# Patient Record
Sex: FEMALE | Race: WHITE
[De-identification: names, ages, dates, MRNs, and addresses within clinical notes are randomized per-mention and may not be internally consistent; named-entity substitution may affect disease eponyms.]

---

## 2019-06-04 ENCOUNTER — HOSPITAL ENCOUNTER (INPATIENT)
Dept: HOSPITAL 26 - MDS | Age: 54
LOS: 1 days | Discharge: HOME | DRG: 263 | End: 2019-06-05
Attending: INTERNAL MEDICINE | Admitting: INTERNAL MEDICINE
Payer: COMMERCIAL

## 2019-06-04 VITALS — DIASTOLIC BLOOD PRESSURE: 60 MMHG | SYSTOLIC BLOOD PRESSURE: 121 MMHG

## 2019-06-04 VITALS — BODY MASS INDEX: 22.19 KG/M2 | HEIGHT: 60 IN | WEIGHT: 113 LBS

## 2019-06-04 VITALS — SYSTOLIC BLOOD PRESSURE: 139 MMHG | DIASTOLIC BLOOD PRESSURE: 69 MMHG

## 2019-06-04 DIAGNOSIS — K80.10: Primary | ICD-10-CM

## 2019-06-04 DIAGNOSIS — E78.5: ICD-10-CM

## 2019-06-04 DIAGNOSIS — I10: ICD-10-CM

## 2019-06-04 PROCEDURE — 0FT44ZZ RESECTION OF GALLBLADDER, PERCUTANEOUS ENDOSCOPIC APPROACH: ICD-10-PCS | Performed by: SURGERY

## 2019-06-04 RX ADMIN — SODIUM CHLORIDE PRN MG: 9 INJECTION, SOLUTION INTRAVENOUS at 22:31

## 2019-06-04 RX ADMIN — HYDROMORPHONE HYDROCHLORIDE PRN MG: 1 INJECTION, SOLUTION INTRAMUSCULAR; INTRAVENOUS; SUBCUTANEOUS at 20:35

## 2019-06-04 RX ADMIN — DEXTROSE AND SODIUM CHLORIDE SCH MLS/HR: 5; .9 INJECTION, SOLUTION INTRAVENOUS at 13:48

## 2019-06-04 RX ADMIN — SODIUM CHLORIDE PRN MG: 9 INJECTION, SOLUTION INTRAVENOUS at 18:26

## 2019-06-04 NOTE — NUR
COMPUTER SLOW DUE TO [email or web address] SCAN THE MED LATER AS SOON AS COMPUTER FIXED.

-------------------------------------------------------------------------------

Addendum: 06/04/19 at 2241 by Zahraa White RN

-------------------------------------------------------------------------------

 ENCODED THE MED MANUALLY AT 2035

## 2019-06-04 NOTE — NUR
RECEIVED PT IN BED,AA, O X 4, [place] SPEAKING ONLY W/ FAMILY, FAMILY ACTS AS . 
PT WAS COMPLAINING THAT SHE WAS NOT FEELING WELL, AS PER PREVIOUS SHIFT & THAT SHE WS 
WHEELED FROM THE OR AND RECEIVED IN THE UNIT AT 1230 PM. SHE WAS C/O OF MORPHINE AND NOT TO 
GIVE TO HER.

## 2019-06-04 NOTE — NUR
FREQUENT ROUNDING ON PT PT IS STABLE AND IN NO APPARENT DISTRESS. NO SIGNS OF REACTION TO 
ANCEF. D5NS INFUSING AT 50ML/HR. PT STATED PAIN IN TOLERABLE AND SHE IS RESTING COMFORTABLY. 
IV SITE SHOWS NO SIGNS OF INFILTRATION OR INFLAMMATION. FAMILY AT BEDSIDE. ALL SAFETY 
MEASURES ARE IN PLACE. WILL CONTINUE TO MONITOR.

## 2019-06-04 NOTE — NUR
ENDORSED PT TO NIGHT SHIFT NURSE PT IS STABLE IN NO APPARENT DISTRESS. ALL SAFETY MEASURES 
ARE IN PLACE

## 2019-06-04 NOTE — NUR
PT ARRIVED ON UNIT POST SURGERY. RECEIVED HANDOFF REPORT. PT IS STABLE AND IN NO APPARENT 
DISTRESS. ALL SAFETY MEASURES ARE IN PLACE. VITAL SIGNS TAKEN 139/69 HR 86 99% SPO2 98.6 
TEMP. PT APPEARS STABLE AND IN NO APPARENT DISTRESS. PT STATED THE ONLY PAIN SHE HAS IS FROM 
THE TRANSFER FROM [facility] TO BED. WILL CONTINUE TO MONITOR.

## 2019-06-05 VITALS — SYSTOLIC BLOOD PRESSURE: 119 MMHG | DIASTOLIC BLOOD PRESSURE: 57 MMHG

## 2019-06-05 VITALS — DIASTOLIC BLOOD PRESSURE: 54 MMHG | SYSTOLIC BLOOD PRESSURE: 122 MMHG

## 2019-06-05 LAB
ANION GAP SERPL CALCULATED.3IONS-SCNC: 12.2 MMOL/L (ref 8–16)
BASOPHILS # BLD AUTO: 0 K/UL (ref 0–0.22)
BASOPHILS NFR BLD AUTO: 0.2 % (ref 0–2)
BUN SERPL-MCNC: 6 MG/DL (ref 7–18)
CHLORIDE SERPL-SCNC: 104 MMOL/L (ref 98–107)
CO2 SERPL-SCNC: 26.6 MMOL/L (ref 21–32)
CREAT SERPL-MCNC: 0.6 MG/DL (ref 0.6–1.3)
EOSINOPHIL # BLD AUTO: 0 K/UL (ref 0–0.4)
EOSINOPHIL NFR BLD AUTO: 0 % (ref 0–4)
ERYTHROCYTE [DISTWIDTH] IN BLOOD BY AUTOMATED COUNT: 12.8 % (ref 11.6–13.7)
GFR SERPL CREATININE-BSD FRML MDRD: 134 ML/MIN (ref 90–?)
GLUCOSE SERPL-MCNC: 134 MG/DL (ref 74–106)
HCT VFR BLD AUTO: 37.8 % (ref 36–48)
HGB BLD-MCNC: 12.5 G/DL (ref 12–16)
LYMPHOCYTES # BLD AUTO: 1.5 K/UL (ref 2.5–16.5)
LYMPHOCYTES NFR BLD AUTO: 11.5 % (ref 20.5–51.1)
MCH RBC QN AUTO: 31 PG (ref 27–31)
MCHC RBC AUTO-ENTMCNC: 33 G/DL (ref 33–37)
MCV RBC AUTO: 92.8 FL (ref 80–94)
MONOCYTES # BLD AUTO: 0.8 K/UL (ref 0.8–1)
MONOCYTES NFR BLD AUTO: 6.3 % (ref 1.7–9.3)
NEUTROPHILS # BLD AUTO: 10.5 K/UL (ref 1.8–7.7)
NEUTROPHILS NFR BLD AUTO: 82 % (ref 42.2–75.2)
PLATELET # BLD AUTO: 224 K/UL (ref 140–450)
POTASSIUM SERPL-SCNC: 3.8 MMOL/L (ref 3.5–5.1)
RBC # BLD AUTO: 4.08 MIL/UL (ref 4.2–5.4)
SODIUM SERPL-SCNC: 139 MMOL/L (ref 136–145)
WBC # BLD AUTO: 12.8 K/UL (ref 4.8–10.8)

## 2019-06-05 RX ADMIN — DEXTROSE AND SODIUM CHLORIDE SCH MLS/HR: 5; .9 INJECTION, SOLUTION INTRAVENOUS at 08:56

## 2019-06-05 NOTE — NUR
PATIENT IS AWAKE AND TALKING TO VISITORS AT BEDSIDE. DENIES PAIN. NO SIGNS OF DISTRESS 
NOTED. SAFETY MEASURES IN PLACE.

## 2019-06-05 NOTE — NUR
PATIENT COMPLAINED PAIN 5/10 AROUND HER INCISIONAL SITES, ADMINISTERED PRN PAIN MED NORCO. 
PATIENT TOLERATED WELL. PATIENT IS SITTING UP ON CHAIR AND TALKING TO FRIENDS BY BEDSIDE. RICH 
DRAIN COLLECTED 20 ML RED DRAINING. INSTRUCTED PATIENT TO USE THE CALL LIGHT FOR ANY 
ASSISTANCE AND PATIENT WAS AWARE.

## 2019-06-05 NOTE — NUR
RECEIVED A CALL FROM SURGEON DR PARKS, REPORTED PATIENT'S CURRENT VITAL SIGNS AND CONDITION TO 
DR PARKS. PER DR PARKS, PATIENT IS OK TO BE DISCHARGE ONCE DR SHAY COMES TO DO THE 
ROUND.

## 2019-06-05 NOTE — NUR
ASSISTED PATIENT TO SIT UP ON BED. PATIENT TOLERATED WELL. DENIES PAIN. NO SIGNS OF DISTRESS 
NOTED. INSTRUCTED PATIENT TO USE THE CALL LIGHT FOR ANY ASSISTANCE AND PATIENT WAS AWARE. 
BED IN  LOW POSITION AND CALL LIGHT WITHIN REACH.

## 2019-06-05 NOTE — NUR
RECEIVED BEDSIDE REPORT FROM NIGHT SHIFT NURSE FOR CONTINUITY OF CARE. PATIENT IS AWAKE AND 
RESTING ON BED AT THIS TIME. PATIENT SPEAKS Irish ONLY, ABLE TO MAKE NEEDS KNOWS AND 
FOLLOW SIMPLE COMMAND. PATIENT DENIES PAIN. RESPIRATION EVEN AND UNLABORED. NO SOB AND SIGNS 
OF DISTRESS NOTED. IV ON L WRIST 20G, INTACT AND CLEAN, INFUSING AS PER MD ORDER. RICH DRAIN 
NOTED AND COLLECTED 10 ML RED DRAINING. DRESSINGS ON ABDOMINAL REGION NOTED, DRY AND INTACT. 
PATIENT IS CONTINENT AND ABLE TO AMBULATE WITH STANDBY ASSIST. DISCUSSED PLAN OF CARE WITH 
PATIENT AND PATIENT VERBALIZED OK. SAFETY MEASURES IN PLACE. INSTRUCTED PATIENT TO USE THE 
CALL LIGHT FOR ANY ASSISTANCE AND PATIENT WAS AWARE.

## 2019-06-05 NOTE — NUR
INFORMED PATIENT THAT SHE WILL BE DISCHARGE HOME TODAY. PER PATIENT, SHE WILL CALL HER NIECE 
AND OLDER SISTER TO PICK HER UP. SHE WILL LET ME KNOW WHEN SHE FINDS OUT WHEN THEY ARE ABLE 
TO PICK HER UP FROM THE HOSPITAL.

## 2019-06-05 NOTE — NUR
PATIENT IS SITTING UP ON A CHAIR AND TALKING TO VISITOR AT BEDSIDE. NO SIGNS OF DISTRESS 
NOTED. INSTRUCTED PATIENT TO USE THE CALL LIGHT FOR ASSISTANCE AND PATIENT WAS AWARE.

## 2019-06-05 NOTE — NUR
PATIENT IS EATING DINNER AND TALKING TO VISITORS. NO SIGNS OF DISTRESS NOTED. WAITING FOR 
NIECE FAIZA AND OLDER SISTER TO BE ARRIVE. SAFETY MEASURES IN PLACE.

## 2019-06-05 NOTE — NUR
DISCHARGE DOCUMENT HAS BEEN SIGNED. D/C RICH DRAIN, AND PATIENT TOLERATED WELL. RICH COLLECTED 
25 ML RED DRAINING. PICTURES TAKEN OF INCISIONAL SITES AND RICH DRAIN SITE. PATIENT IS TALKING 
TO VISITORS AT BEDSIDE. NO SIGNS OF DISTRESS NOTED. AWAITING FOR NIECE AND OLDER SISTER TO 
BE ARRIVE.

## 2019-06-05 NOTE — NUR
DISCHARGE INSTRUCTION PROVIDED TO PATIENT AND PATIENT'S OLD SISTER AND NIECE FAIZA. PRINTED 
DISCHARGE INSTRUCTION PROVIDED, PRESCRIPTION PROVIDED AND DR PARKS BUSINESS CARD PROVIDED. 
EDUCATED PATIENT TO FOLLOW UP WITH MD, INCISIONAL CARE, MEDICATION REGIMEN, AND GO SEEK 
MEDICAL HELPS IF SHE EXPERIENCES SOB, SWELLING, PAIN, GENERALIZED WEAKNESS. PATIENT AND 
FAMILY VERBALIZED UNDERSTANDING. D/C IV AND IV CANNULA INTACT, MINIMAL BLEEDING AT IV SITE. 
REMOVED ALL ID BANDS. PATIENT CHECKED ALL THE CABINETS AND TOOK ALL HER BELONGINGS. PATIENT 
IS DISCHARGE AT THIS TIME ACCOMPANIED BY FAMILY. PATIENT IS IN A STABLE CONDITION.

## 2019-06-05 NOTE — NUR
PATIENT IS TALKING TO VISITORS AT BEDSIDE. DENIES PAIN. NO SIGNS OF DISTRESS NOTED. SAFETY 
MEASURES IN PLACE.